# Patient Record
Sex: MALE | Race: WHITE | Employment: UNEMPLOYED | URBAN - METROPOLITAN AREA
[De-identification: names, ages, dates, MRNs, and addresses within clinical notes are randomized per-mention and may not be internally consistent; named-entity substitution may affect disease eponyms.]

---
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
## 2019-02-18 ENCOUNTER — OFFICE VISIT (OUTPATIENT)
Dept: FAMILY MEDICINE CLINIC | Age: 19
End: 2019-02-18
Payer: COMMERCIAL

## 2019-02-18 VITALS
WEIGHT: 177.8 LBS | HEART RATE: 85 BPM | BODY MASS INDEX: 23.56 KG/M2 | OXYGEN SATURATION: 98 % | RESPIRATION RATE: 24 BRPM | SYSTOLIC BLOOD PRESSURE: 118 MMHG | HEIGHT: 73 IN | TEMPERATURE: 98.1 F | DIASTOLIC BLOOD PRESSURE: 80 MMHG

## 2019-02-18 DIAGNOSIS — Q67.6 PECTUS EXCAVATUM: ICD-10-CM

## 2019-02-18 DIAGNOSIS — M40.04 POSTURAL KYPHOSIS OF THORACIC REGION: Primary | ICD-10-CM

## 2019-02-18 DIAGNOSIS — F98.8 ATTENTION DEFICIT DISORDER (ADD) WITHOUT HYPERACTIVITY: ICD-10-CM

## 2019-02-18 PROCEDURE — 99214 OFFICE O/P EST MOD 30 MIN: CPT | Performed by: FAMILY MEDICINE

## 2019-02-18 PROCEDURE — G8484 FLU IMMUNIZE NO ADMIN: HCPCS | Performed by: FAMILY MEDICINE

## 2019-02-18 PROCEDURE — G8420 CALC BMI NORM PARAMETERS: HCPCS | Performed by: FAMILY MEDICINE

## 2019-02-18 PROCEDURE — G8427 DOCREV CUR MEDS BY ELIG CLIN: HCPCS | Performed by: FAMILY MEDICINE

## 2019-02-18 PROCEDURE — 1036F TOBACCO NON-USER: CPT | Performed by: FAMILY MEDICINE

## 2019-02-18 ASSESSMENT — ENCOUNTER SYMPTOMS
NAUSEA: 0
SHORTNESS OF BREATH: 0
COUGH: 0
BACK PAIN: 0
HEMOPTYSIS: 0
ORTHOPNEA: 0
ABDOMINAL PAIN: 0

## 2019-02-18 ASSESSMENT — PATIENT HEALTH QUESTIONNAIRE - PHQ9
SUM OF ALL RESPONSES TO PHQ QUESTIONS 1-9: 0
2. FEELING DOWN, DEPRESSED OR HOPELESS: 0
SUM OF ALL RESPONSES TO PHQ9 QUESTIONS 1 & 2: 0
SUM OF ALL RESPONSES TO PHQ QUESTIONS 1-9: 0
1. LITTLE INTEREST OR PLEASURE IN DOING THINGS: 0

## 2019-02-26 ENCOUNTER — HOSPITAL ENCOUNTER (OUTPATIENT)
Age: 19
Discharge: HOME | End: 2019-02-26
Payer: COMMERCIAL

## 2019-02-26 ENCOUNTER — HOSPITAL ENCOUNTER (OUTPATIENT)
Dept: GENERAL RADIOLOGY | Age: 19
Discharge: HOME | End: 2019-02-26
Payer: COMMERCIAL

## 2019-02-26 DIAGNOSIS — M40.04 POSTURAL KYPHOSIS OF THORACIC REGION: ICD-10-CM

## 2019-02-26 DIAGNOSIS — Q67.6 PECTUS EXCAVATUM: ICD-10-CM

## 2019-02-26 PROCEDURE — 71046 X-RAY EXAM CHEST 2 VIEWS: CPT

## 2019-02-26 PROCEDURE — 72072 X-RAY EXAM THORAC SPINE 3VWS: CPT

## 2021-01-22 ENCOUNTER — OFFICE VISIT (OUTPATIENT)
Dept: PRIMARY CARE CLINIC | Age: 21
End: 2021-01-22
Payer: COMMERCIAL

## 2021-01-22 DIAGNOSIS — Z20.822 SUSPECTED COVID-19 VIRUS INFECTION: Primary | ICD-10-CM

## 2021-01-22 PROCEDURE — G8428 CUR MEDS NOT DOCUMENT: HCPCS | Performed by: NURSE PRACTITIONER

## 2021-01-22 PROCEDURE — 99211 OFF/OP EST MAY X REQ PHY/QHP: CPT | Performed by: NURSE PRACTITIONER

## 2021-01-22 PROCEDURE — G8421 BMI NOT CALCULATED: HCPCS | Performed by: NURSE PRACTITIONER

## 2021-01-23 LAB — SARS-COV-2, NAA: NOT DETECTED

## 2021-04-01 ENCOUNTER — TELEPHONE (OUTPATIENT)
Dept: FAMILY MEDICINE CLINIC | Age: 21
End: 2021-04-01

## 2021-04-01 ENCOUNTER — OFFICE VISIT (OUTPATIENT)
Dept: FAMILY MEDICINE CLINIC | Age: 21
End: 2021-04-01
Payer: COMMERCIAL

## 2021-04-01 VITALS
HEIGHT: 73 IN | WEIGHT: 183.6 LBS | DIASTOLIC BLOOD PRESSURE: 70 MMHG | BODY MASS INDEX: 24.33 KG/M2 | SYSTOLIC BLOOD PRESSURE: 120 MMHG | TEMPERATURE: 98.2 F

## 2021-04-01 DIAGNOSIS — K14.0 GLOSSITIS: Primary | ICD-10-CM

## 2021-04-01 PROCEDURE — G8427 DOCREV CUR MEDS BY ELIG CLIN: HCPCS | Performed by: FAMILY MEDICINE

## 2021-04-01 PROCEDURE — G8420 CALC BMI NORM PARAMETERS: HCPCS | Performed by: FAMILY MEDICINE

## 2021-04-01 PROCEDURE — 99213 OFFICE O/P EST LOW 20 MIN: CPT | Performed by: FAMILY MEDICINE

## 2021-04-01 PROCEDURE — 1036F TOBACCO NON-USER: CPT | Performed by: FAMILY MEDICINE

## 2021-04-01 RX ORDER — LIDOCAINE HYDROCHLORIDE 20 MG/ML
10 SOLUTION OROPHARYNGEAL
Qty: 300 ML | Refills: 1 | Status: SHIPPED | OUTPATIENT
Start: 2021-04-01

## 2021-04-01 RX ORDER — DEXTROAMPHETAMINE SACCHARATE, AMPHETAMINE ASPARTATE MONOHYDRATE, DEXTROAMPHETAMINE SULFATE AND AMPHETAMINE SULFATE 5; 5; 5; 5 MG/1; MG/1; MG/1; MG/1
CAPSULE, EXTENDED RELEASE ORAL
COMMUNITY
Start: 2021-03-09

## 2021-04-01 RX ORDER — METHYLPREDNISOLONE 4 MG/1
TABLET ORAL
Qty: 1 KIT | Refills: 0 | Status: SHIPPED | OUTPATIENT
Start: 2021-04-01 | End: 2021-04-07

## 2021-04-01 ASSESSMENT — ENCOUNTER SYMPTOMS
GASTROINTESTINAL NEGATIVE: 1
RESPIRATORY NEGATIVE: 1

## 2021-04-01 ASSESSMENT — PATIENT HEALTH QUESTIONNAIRE - PHQ9
SUM OF ALL RESPONSES TO PHQ QUESTIONS 1-9: 0
SUM OF ALL RESPONSES TO PHQ QUESTIONS 1-9: 0
SUM OF ALL RESPONSES TO PHQ9 QUESTIONS 1 & 2: 0

## 2021-04-01 NOTE — TELEPHONE ENCOUNTER
----- Message from [employer] sent at 4/1/2021  8:06 AM EDT -----  Subject: Message to Provider    QUESTIONS  Information for Provider? Patient is calling wanting an appointment for   sore throat and with horrible pain on tip of tongue its like if you bite   it or burn tongue on food but pain is unbearable at this point.  ---------------------------------------------------------------------------  --------------  CALL BACK INFO  What is the best way for the office to contact you? OK to leave message on   voicemail  Preferred Call Back Phone Number? [phone]  ---------------------------------------------------------------------------  --------------  SCRIPT ANSWERS  Relationship to Patient? Self  Appointment reason? Symptomatic  Select script based on patient symptoms? Adult Cough/Cold Symptoms [Runny   Nose   Sore Throat   Flu   Sinus   Sinus Infection   Upper Respiratory Infection [URI]   Congestion]   Are you currently unable to finish sentences due to any difficulty   breathing? No  Are you unable to swallow liquids? No  Are you having fevers (100.4 or greater)   chills   or sweats? No  Do you have COPD   asthma or a chronic lung condition? No  Have your symptoms been present for more than 5 days?  No

## 2021-04-01 NOTE — PROGRESS NOTES
Deloris Wilkins (:  2000) is a 23 y.o. male,Established patient, here for evaluation of the following chief complaint(s):  Pain (tongue and throat  x 5 days)      Assessment/plan;  Lillie Garcia was seen today for pain. Diagnoses and all orders for this visit:    Glossitis  -     methylPREDNISolone (MEDROL DOSEPACK) 4 MG tablet; Take by mouth.  -     lidocaine viscous hcl (XYLOCAINE) 2 % SOLN solution; Take 10 mLs by mouth every 3 hours as needed for Irritation    call next week if not feeling better   SUBJECTIVE/OBJECTIVE:  HPI  Tip of tongue hurts and is burning  Started a few days ago  Has to keep ice in his mouth to cool his tongue off  Some pnd and had tonsil stone last night  No fever or chills  Did burn his tongue on hot food last week   Review of Systems   Constitutional: Negative. HENT: Negative. Respiratory: Negative. Cardiovascular: Negative. Gastrointestinal: Negative. Neurological: Negative. Psychiatric/Behavioral: Positive for decreased concentration. Physical Exam  Constitutional:       General: He is not in acute distress. Appearance: He is well-developed. HENT:      Head: Normocephalic. Nose:      Comments: Tip of tongue with erythema   Neurological:      Mental Status: He is alert and oriented to person, place, and time. Psychiatric:         Behavior: Behavior normal.         Thought Content: Thought content normal.         Judgment: Judgment normal.                 An electronic signature was used to authenticate this note.     --Priscilla García DO

## 2021-12-07 ENCOUNTER — TELEPHONE (OUTPATIENT)
Dept: FAMILY MEDICINE CLINIC | Age: 21
End: 2021-12-07

## 2021-12-07 DIAGNOSIS — R68.89 FLU-LIKE SYMPTOMS: Primary | ICD-10-CM

## 2021-12-07 NOTE — TELEPHONE ENCOUNTER
----- Message from Leoma Dancer sent at 12/7/2021  9:43 AM EST -----  Subject: Appointment Request    Reason for Call: Urgent Cough Cold    QUESTIONS  Type of Appointment? Established Patient  Reason for appointment request? No appointments available during search  Additional Information for Provider? pt has flu also came out of covid 19   quarantine 11/26/2021 is asking to be seen asap   ---------------------------------------------------------------------------  --------------  [address]  What is the best way for the office to contact you? OK to leave message on   voicemail  Preferred Call Back Phone Number? [phone]  ---------------------------------------------------------------------------  --------------  SCRIPT ANSWERS  Relationship to Patient? Self  Are you currently unable to finish sentences due to any difficulty   breathing? No  Are you unable to swallow liquids? No  Are you having fevers (100.4 or greater), chills, or sweats? Yes   Have you been diagnosed with, awaiting test results for, or told that you   are suspected of having COVID-19 (Coronavirus)? (If patient has tested   negative or was tested as a requirement for work, school, or travel and   not based on symptoms, answer no)? Yes  Did your symptoms begin within the past 14 days or was your positive test   result within the past 14 days?  Yes

## 2021-12-08 ENCOUNTER — NURSE ONLY (OUTPATIENT)
Dept: PRIMARY CARE CLINIC | Age: 21
End: 2021-12-08
Payer: COMMERCIAL

## 2021-12-08 DIAGNOSIS — R68.89 FLU-LIKE SYMPTOMS: ICD-10-CM

## 2021-12-08 DIAGNOSIS — Z11.52 ENCOUNTER FOR SCREENING FOR COVID-19: Primary | ICD-10-CM

## 2021-12-08 LAB
INFLUENZA A ANTIBODY: NEGATIVE
INFLUENZA B ANTIBODY: NEGATIVE

## 2021-12-08 PROCEDURE — 87804 INFLUENZA ASSAY W/OPTIC: CPT | Performed by: FAMILY MEDICINE

## 2021-12-08 NOTE — PROGRESS NOTES
Salud Nolasco received a viral test for COVID-19. They were educated on isolation and quarantine as appropriate. For any symptoms, they were directed to seek care from their PCP, given contact information to establish with a doctor, directed to an urgent care or the emergency room.

## 2021-12-09 ENCOUNTER — VIRTUAL VISIT (OUTPATIENT)
Dept: FAMILY MEDICINE CLINIC | Age: 21
End: 2021-12-09
Payer: COMMERCIAL

## 2021-12-09 DIAGNOSIS — J01.90 ACUTE BACTERIAL SINUSITIS: Primary | ICD-10-CM

## 2021-12-09 DIAGNOSIS — B96.89 ACUTE BACTERIAL SINUSITIS: Primary | ICD-10-CM

## 2021-12-09 PROCEDURE — 99213 OFFICE O/P EST LOW 20 MIN: CPT | Performed by: FAMILY MEDICINE

## 2021-12-09 PROCEDURE — G8427 DOCREV CUR MEDS BY ELIG CLIN: HCPCS | Performed by: FAMILY MEDICINE

## 2021-12-09 RX ORDER — AMOXICILLIN 500 MG/1
500 CAPSULE ORAL 3 TIMES DAILY
Qty: 21 CAPSULE | Refills: 0 | Status: SHIPPED | OUTPATIENT
Start: 2021-12-09 | End: 2021-12-16

## 2021-12-09 ASSESSMENT — ENCOUNTER SYMPTOMS
SINUS PRESSURE: 1
SORE THROAT: 1
COUGH: 1

## 2021-12-10 NOTE — PROGRESS NOTES
Abdulaziz Cervantes (:  2000) is a 24 y.o. male,Established patient, here for evaluation of the following chief complaint(s): Congestion         ASSESSMENT/PLAN:  1. Acute bacterial sinusitis  -     amoxicillin (AMOXIL) 500 MG capsule; Take 1 capsule by mouth 3 times daily for 7 days, Disp-21 capsule, R-0Normal      No follow-ups on file. SUBJECTIVE/OBJECTIVE:  Sinusitis  This is a new problem. The current episode started in the past 7 days. The problem has been gradually worsening since onset. There has been no fever. The pain is moderate. Associated symptoms include congestion, coughing, headaches, sinus pressure, sneezing and a sore throat. Past treatments include acetaminophen and oral decongestants. The treatment provided moderate relief. Review of Systems   HENT: Positive for congestion, sinus pressure, sneezing and sore throat. Respiratory: Positive for cough. Neurological: Positive for headaches. No flowsheet data found. Physical Exam  Constitutional:       General: He is not in acute distress. Appearance: He is well-developed. HENT:      Head: Normocephalic. Neurological:      Mental Status: He is alert and oriented to person, place, and time. Psychiatric:         Behavior: Behavior normal.         Thought Content: Thought content normal.         Judgment: Judgment normal.           Dayton Escalera, was evaluated through a synchronous (real-time) audio-video encounter. The patient (or guardian if applicable) is aware that this is a billable service. Verbal consent to proceed has been obtained within the past 12 months. The visit was conducted pursuant to the emergency declaration under the [address] [place] [address], [address] [place] [address] authority and the Erik Agillic and [place] [employer] Act. Patient identification was verified, and a caregiver was present when appropriate.  The patient was located in a state where the provider was credentialed to provide care. An electronic signature was used to authenticate this note.     --Tracie Garcia, DO

## 2022-02-19 ENCOUNTER — E-VISIT (OUTPATIENT)
Dept: FAMILY MEDICINE CLINIC | Age: 22
End: 2022-02-19
Payer: COMMERCIAL

## 2022-02-19 PROCEDURE — 99421 OL DIG E/M SVC 5-10 MIN: CPT | Performed by: FAMILY MEDICINE

## 2022-02-19 RX ORDER — CYCLOBENZAPRINE HCL 10 MG
10 TABLET ORAL 3 TIMES DAILY PRN
Qty: 21 TABLET | Refills: 0 | Status: SHIPPED | OUTPATIENT
Start: 2022-02-19 | End: 2022-03-01